# Patient Record
Sex: FEMALE | Race: BLACK OR AFRICAN AMERICAN | NOT HISPANIC OR LATINO | URBAN - METROPOLITAN AREA
[De-identification: names, ages, dates, MRNs, and addresses within clinical notes are randomized per-mention and may not be internally consistent; named-entity substitution may affect disease eponyms.]

---

## 2022-10-01 ENCOUNTER — HOSPITAL ENCOUNTER (OUTPATIENT)
Dept: ULTRASOUND IMAGING | Age: 32
Discharge: HOME | End: 2022-10-01
Attending: ADVANCED PRACTICE MIDWIFE

## 2022-10-01 DIAGNOSIS — N91.2 AMENORRHEA: ICD-10-CM

## 2022-10-01 DIAGNOSIS — O00.90 EP (ECTOPIC PREGNANCY): ICD-10-CM

## 2022-10-01 DIAGNOSIS — O26.841 UTERINE SIZE DATE DISCREPANCY PREGNANCY, FIRST TRIMESTER: ICD-10-CM

## 2022-10-01 PROCEDURE — 76801 OB US < 14 WKS SINGLE FETUS: CPT

## 2022-10-03 ENCOUNTER — TELEPHONE (OUTPATIENT)
Dept: OBGYN CLINIC | Facility: CLINIC | Age: 32
End: 2022-10-03

## 2022-10-03 NOTE — TELEPHONE ENCOUNTER
Would like to speak with midwifes before committing to using them, please call to advise on what the process it. LMP 07/29/2022.

## 2024-05-21 DIAGNOSIS — M25.552 PAIN IN LEFT HIP: ICD-10-CM

## 2024-05-21 DIAGNOSIS — M25.551 PAIN IN RIGHT HIP: ICD-10-CM

## 2024-05-31 ENCOUNTER — HOSPITAL ENCOUNTER (OUTPATIENT)
Dept: REHABILITATION | Age: 34
Discharge: HOME | End: 2024-05-31
Attending: PHYSICIAN ASSISTANT

## 2024-05-31 PROCEDURE — 97161 PT EVAL LOW COMPLEX 20 MIN: CPT

## 2024-05-31 PROCEDURE — 97530 THERAPEUTIC ACTIVITIES: CPT

## 2024-05-31 PROCEDURE — 97110 THERAPEUTIC EXERCISES: CPT

## 2024-05-31 ASSESSMENT — MOVEMENT AND STRENGTH ASSESSMENTS
YOUR USUAL HOBBIES, RECREATIONAL OR SPORTING ACTIVIITIES: A LITTLE BIT OF DIFFICULTY
TOTAL SCORE: 91.25
WALKING A MILE: NO DIFFICULTY
GETTING INTO OR OUT OF A CAR: NO DIFFICULTY
LIFTING AN OBJECT, LIKE A BAG OF GROCERIES, FROM THE FLOOR: A LITTLE BIT OF DIFFICULTY
HOPPING: NO DIFFICULTY
STANDING FOR 1 HOUR: A LITTLE BIT OF DIFFICULTY
GETTING INTO OR OUT OF THE BATH: NO DIFFICULTY
SITTING FOR 1 HOUR: NO DIFFICULTY
PERFORMING HEAVY ACTIVITIES AROUND YOUR HOME: A LITTLE BIT OF DIFFICULTY
SQUATTING: NO DIFFICULTY
GOING UP OR DOWN 10 STAIRS (ABOUT 1 FLIGHT OF STAIRS): A LITTLE BIT OF DIFFICULTY
WALKING BETWEEN ROOMS: NO DIFFICULTY
ANY OF YOUR USUAL WORK, HOUSEWORK OR SCHOOL ACTIVITIES: A LITTLE BIT OF DIFFICULTY
MAKING SHARP TURNS WHILE RUNNING FAST: NO DIFFICULTY
PUTTING ON YOUR SHOES OR SOCKS: NO DIFFICULTY
RUNNING ON UNEVEN GROUND: NO DIFFICULTY
RUNNING ON EVEN GROUND: NO DIFFICULTY
WALKING 2 BLOCKS: NO DIFFICULTY
ROLLING OVER IN BED: A LITTLE BIT OF DIFFICULTY
PERFORMING LIGHT ACTIVITES AROUND YOUR HOME: NO DIFFICULTY

## 2024-05-31 ASSESSMENT — ENCOUNTER SYMPTOMS
ALLEVIATING FACTORS: CHANGE IN POSITION
QUALITY: THROBBING
ALLEVIATING FACTORS: REST
ALLEVIATING FACTORS: AVOIDS AGGRAVATING ACTIVITY
QUALITY: ACHE

## 2024-06-10 ENCOUNTER — TELEPHONE (OUTPATIENT)
Dept: REHABILITATION | Age: 34
End: 2024-06-10

## 2024-06-17 ENCOUNTER — TELEPHONE (OUTPATIENT)
Dept: REHABILITATION | Age: 34
End: 2024-06-17

## 2024-06-28 ENCOUNTER — HOSPITAL ENCOUNTER (OUTPATIENT)
Dept: REHABILITATION | Age: 34
Discharge: STILL ADMITTED OR ADMINISTRATIVE | End: 2024-06-28
Attending: PHYSICIAN ASSISTANT

## 2024-06-28 PROCEDURE — 97530 THERAPEUTIC ACTIVITIES: CPT

## 2024-06-28 PROCEDURE — 97110 THERAPEUTIC EXERCISES: CPT

## 2024-07-03 ENCOUNTER — TELEPHONE (OUTPATIENT)
Dept: REHABILITATION | Age: 34
End: 2024-07-03

## 2024-07-10 ENCOUNTER — TELEPHONE (OUTPATIENT)
Dept: REHABILITATION | Age: 34
End: 2024-07-10

## 2024-07-17 ENCOUNTER — APPOINTMENT (OUTPATIENT)
Dept: REHABILITATION | Age: 34
End: 2024-07-17
Attending: PHYSICIAN ASSISTANT

## 2024-11-20 ENCOUNTER — HOSPITAL ENCOUNTER (OUTPATIENT)
Age: 34
Discharge: HOME | End: 2024-11-20
Attending: OBSTETRICS & GYNECOLOGY | Admitting: OBSTETRICS & GYNECOLOGY

## 2024-11-20 ENCOUNTER — APPOINTMENT (OUTPATIENT)
Dept: ULTRASOUND IMAGING | Age: 34
End: 2024-11-20
Attending: OBSTETRICS & GYNECOLOGY

## 2024-11-20 VITALS
HEART RATE: 91 BPM | WEIGHT: 192.02 LBS | DIASTOLIC BLOOD PRESSURE: 85 MMHG | BODY MASS INDEX: 34.02 KG/M2 | SYSTOLIC BLOOD PRESSURE: 126 MMHG | HEIGHT: 63 IN

## 2024-11-20 PROCEDURE — 99202 OFFICE O/P NEW SF 15 MIN: CPT

## 2024-11-20 PROCEDURE — 76818 FETAL BIOPHYS PROFILE W/NST: CPT

## 2024-11-20 RX ORDER — VITAMIN A ACETATE, BETA CAROTENE, ASCORBIC ACID, CHOLECALCIFEROL, .ALPHA.-TOCOPHEROL ACETATE, DL-, THIAMINE MONONITRATE, RIBOFLAVIN, NIACINAMIDE, PYRIDOXINE HYDROCHLORIDE, FOLIC ACID, CYANOCOBALAMIN, CALCIUM CARBONATE, FERROUS FUMARATE, ZINC OXIDE, CUPRIC OXIDE 3080; 12; 120; 400; 1; 1.84; 3; 20; 22; 920; 25; 200; 27; 10; 2 [IU]/1; UG/1; MG/1; [IU]/1; MG/1; MG/1; MG/1; MG/1; MG/1; [IU]/1; MG/1; MG/1; MG/1; MG/1; MG/1
1 TABLET, FILM COATED ORAL DAILY
COMMUNITY